# Patient Record
Sex: FEMALE | Race: WHITE | Employment: UNEMPLOYED | URBAN - METROPOLITAN AREA
[De-identification: names, ages, dates, MRNs, and addresses within clinical notes are randomized per-mention and may not be internally consistent; named-entity substitution may affect disease eponyms.]

---
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
## 2022-07-23 ENCOUNTER — HOSPITAL ENCOUNTER (EMERGENCY)
Age: 22
Discharge: HOME | End: 2022-07-23
Attending: EMERGENCY MEDICINE
Payer: COMMERCIAL

## 2022-07-23 VITALS
DIASTOLIC BLOOD PRESSURE: 83 MMHG | WEIGHT: 109 LBS | HEART RATE: 98 BPM | BODY MASS INDEX: 20.58 KG/M2 | OXYGEN SATURATION: 100 % | TEMPERATURE: 98.5 F | SYSTOLIC BLOOD PRESSURE: 123 MMHG | HEIGHT: 61 IN | RESPIRATION RATE: 17 BRPM

## 2022-07-23 DIAGNOSIS — K08.89 PAIN, DENTAL: ICD-10-CM

## 2022-07-23 DIAGNOSIS — R51.9 HEADACHE, UNSPECIFIED HEADACHE TYPE: Primary | ICD-10-CM

## 2022-07-23 PROCEDURE — 99283 EMERGENCY DEPT VISIT LOW MDM: CPT

## 2022-07-23 RX ORDER — AMOXICILLIN 500 MG/1
500 CAPSULE ORAL 3 TIMES DAILY
Qty: 21 CAPSULE | Refills: 0 | Status: SHIPPED | OUTPATIENT
Start: 2022-07-23 | End: 2022-07-23

## 2022-07-23 RX ORDER — AMOXICILLIN 400 MG/5ML
400 POWDER, FOR SUSPENSION ORAL 3 TIMES DAILY
Qty: 105 ML | Refills: 0 | Status: SHIPPED | OUTPATIENT
Start: 2022-07-23 | End: 2022-07-30

## 2022-07-23 ASSESSMENT — PAIN DESCRIPTION - DESCRIPTORS
DESCRIPTORS: SHARP
DESCRIPTORS: SHARP

## 2022-07-23 ASSESSMENT — PAIN DESCRIPTION - PAIN TYPE
TYPE: ACUTE PAIN
TYPE: ACUTE PAIN

## 2022-07-23 ASSESSMENT — PAIN DESCRIPTION - LOCATION
LOCATION: HEAD
LOCATION: HEAD

## 2022-07-23 ASSESSMENT — PAIN SCALES - GENERAL
PAINLEVEL_OUTOF10: 6
PAINLEVEL_OUTOF10: 6

## 2022-07-23 ASSESSMENT — PAIN DESCRIPTION - FREQUENCY
FREQUENCY: INTERMITTENT
FREQUENCY: INTERMITTENT

## 2022-07-23 ASSESSMENT — PAIN - FUNCTIONAL ASSESSMENT: PAIN_FUNCTIONAL_ASSESSMENT: 0-10

## 2022-07-23 NOTE — ED PROVIDER NOTES
[facility] Emergency Department      Pt Name: Noam Díaz  MRN: [id number]  [place] 2000  Date of evaluation: 7/23/2022  Provider: Lyn Smith MD  CHIEF COMPLAINT  Chief Complaint   Patient presents with    Dental Pain     Has had dental pain off and on over the past few months. Tylenol and Ibuprofen is helping with the pain. It is on the right side of her mouth. No obvious facial swelling. Headache     Developed a headache yesterday. Took Ibuprofen earlier today and just took a dose of Tylenol prior to arrival.   No fever, vomiting or diarrhea. States it may be related to her dental pain, but she never gets headaches, and the Tylenol and Ibuprofen didn't seem to help her headache. HPI  Noam Díaz is a 22 y.o. female who presents because of toothache. She has had pain to a right lower molar for several months. She has had 2 different dentists evaluate the area. One thought there was an infection and she took a course of antibiotics. The other 1 did not think there was a problem. She did have some x-rays taken at those visits. Over the past few days, she has had some increased pain to that tooth. Tylenol and ibuprofen does alleviate the discomfort. In addition, she is concerned about a headache that she developed yesterday. She has had some sharp pains in various spots on the right side of her head with some dull underlying pain. She says the medicine she has been taking does help the pain but was wondering if the tooth pain and the headache were related. She denies any numbness, tingling or focal weakness. Denies any photophobia. Denies any fever or chills. Denies any cough or congestion. She says she typically does not get headaches. Gradual onset of discomfort. No injury. REVIEW OF SYSTEMS:  No fever, no vomiting, no visual change, chronic anxiety and paranoia, no excessive life stressors Pertinent positives and negatives as per the HPI.   All other review of systems reviewed and negative. Nursing notes reviewed. PAST MEDICAL HISTORY  Past Medical History:   Diagnosis Date    Anxiety     Other insomnia      SURGICAL HISTORY  History reviewed. No pertinent surgical history. MEDICATIONS:  No current facility-administered medications on file prior to encounter. No current outpatient medications on file prior to encounter. ALLERGIES  Patient has no known allergies. SOCIAL HISTORY:  Social History     Tobacco Use    Smoking status: Never     Passive exposure: Current    Smokeless tobacco: Never   Vaping Use    Vaping Use: Never used   Substance Use Topics    Alcohol use: Never    Drug use: No     IMMUNIZATIONS:  Noncontributory    PHYSICAL EXAM  VITAL SIGNS:  Blood pressure 123/83, pulse 98, temperature 98.5 °F (36.9 °C), resp. rate 17, height 5' 1\" (1.549 m), weight 109 lb (49.4 kg), last menstrual period 06/21/2022, SpO2 100 %, not currently breastfeeding. Constitutional:  22 y.o. female nontoxic, tolerates secretions normally  HENT:  Atraumatic, mucous membranes moist, no fluctuance of the gums, some gingivitis, dental decay not apparent but does have a filling in the painful tooth, TMs clear  Eyes:   Conjunctiva clear, no icterus  Neck:  Supple, no swelling, no adenopathy  Thorax & Lungs:  Respiratory effort normal, clear, regular  Abdomen:  Non distended  Back:  No deformity  Extremities:  No cyanosis, no edema  Skin:  Warm, dry  Neurologic:  Alert, normal coordination, normal voice and speech, normal gait    DIAGNOSTIC RESULTS:  None    ED COURSE:  Uneventful    PROCEDURES:  None    CONSULTATIONS:  None    MEDICAL DECISION MAKING: Riley Zapata is a 22 y.o. female who presented with a toothache and headache. Exam is benign. There is no evidence of toxicity.   I do not feel the headache symptoms reflect a more emergent condition such as meningitis, encephalitis, ICH, sentinel bleed from ruptured aneurysm, thrombosis, TIA, pseudotumor cerebri, temporal arteritis, hypertensive urgency or emergency, acute angle glaucoma, tumor with mass effect, sinus abscess, mastoiditis, shingles, trigeminal neuralgia, CO poisoning, etc. Tanna R Cheli Kinsey was given appropriate discharge instructions. Referral to follow up provider. New Prescriptions    AMOXICILLIN (AMOXIL) 500 MG CAPSULE    Take 1 capsule by mouth in the morning and 1 capsule at noon and 1 capsule before bedtime. Do all this for 7 days. FOLLOW UP:    NOMAN Ludwig - CNP  [facility] 1  [phone]    Schedule an appointment as soon as possible for a visit       FINAL IMPRESSION:    1. Headache, unspecified headache type    2. Pain, dental      (Please note that I used voice recognition software to generate this note.   Occasionally words are mistranscribed despite my efforts to edit errors.)        Janine Day MD  07/23/22 [id number]

## 2022-07-23 NOTE — ED TRIAGE NOTES
Has had dental pain off and on over the past few months. Tylenol and Ibuprofen is helping with the pain. It is on the right side of her mouth. No obvious facial swelling. Developed a headache yesterday. Took Ibuprofen earlier today and just took a dose of Tylenol prior to arrival.   No fever, vomiting or diarrhea. States it may be related to her dental pain, but she never gets headaches, and the Tylenol and Ibuprofen didn't seem to help her headache. She is talkative and pleasant. No injury or fall.

## 2023-01-20 ENCOUNTER — OFFICE VISIT (OUTPATIENT)
Dept: FAMILY MEDICINE CLINIC | Age: 23
End: 2023-01-20
Payer: COMMERCIAL

## 2023-01-20 VITALS
OXYGEN SATURATION: 98 % | SYSTOLIC BLOOD PRESSURE: 100 MMHG | WEIGHT: 107.8 LBS | TEMPERATURE: 98.6 F | HEIGHT: 61 IN | HEART RATE: 81 BPM | BODY MASS INDEX: 20.35 KG/M2 | DIASTOLIC BLOOD PRESSURE: 60 MMHG

## 2023-01-20 DIAGNOSIS — Z76.89 ENCOUNTER TO ESTABLISH CARE: Primary | ICD-10-CM

## 2023-01-20 DIAGNOSIS — F43.22 ADJUSTMENT DISORDER WITH ANXIETY: ICD-10-CM

## 2023-01-20 PROCEDURE — G8420 CALC BMI NORM PARAMETERS: HCPCS | Performed by: NURSE PRACTITIONER

## 2023-01-20 PROCEDURE — 1036F TOBACCO NON-USER: CPT | Performed by: NURSE PRACTITIONER

## 2023-01-20 PROCEDURE — 99203 OFFICE O/P NEW LOW 30 MIN: CPT | Performed by: NURSE PRACTITIONER

## 2023-01-20 PROCEDURE — G8427 DOCREV CUR MEDS BY ELIG CLIN: HCPCS | Performed by: NURSE PRACTITIONER

## 2023-01-20 PROCEDURE — G8484 FLU IMMUNIZE NO ADMIN: HCPCS | Performed by: NURSE PRACTITIONER

## 2023-01-20 SDOH — ECONOMIC STABILITY: FOOD INSECURITY: WITHIN THE PAST 12 MONTHS, YOU WORRIED THAT YOUR FOOD WOULD RUN OUT BEFORE YOU GOT MONEY TO BUY MORE.: NEVER TRUE

## 2023-01-20 SDOH — ECONOMIC STABILITY: FOOD INSECURITY: WITHIN THE PAST 12 MONTHS, THE FOOD YOU BOUGHT JUST DIDN'T LAST AND YOU DIDN'T HAVE MONEY TO GET MORE.: NEVER TRUE

## 2023-01-20 ASSESSMENT — PATIENT HEALTH QUESTIONNAIRE - PHQ9
1. LITTLE INTEREST OR PLEASURE IN DOING THINGS: 0
SUM OF ALL RESPONSES TO PHQ QUESTIONS 1-9: 0
SUM OF ALL RESPONSES TO PHQ QUESTIONS 1-9: 0
SUM OF ALL RESPONSES TO PHQ9 QUESTIONS 1 & 2: 0
SUM OF ALL RESPONSES TO PHQ QUESTIONS 1-9: 0
2. FEELING DOWN, DEPRESSED OR HOPELESS: 0
SUM OF ALL RESPONSES TO PHQ QUESTIONS 1-9: 0

## 2023-01-20 ASSESSMENT — ENCOUNTER SYMPTOMS
COUGH: 0
ABDOMINAL PAIN: 0

## 2023-01-20 ASSESSMENT — SOCIAL DETERMINANTS OF HEALTH (SDOH): HOW HARD IS IT FOR YOU TO PAY FOR THE VERY BASICS LIKE FOOD, HOUSING, MEDICAL CARE, AND HEATING?: NOT HARD AT ALL

## 2023-01-20 NOTE — PROGRESS NOTES
[address] (:  2000) is a 25 y.o. female,Established patient, here for evaluation of the following chief complaint(s):  New Patient (To get established. ) and Anxiety (Pt does not want anxiety medication. Pt is very leary and panicked of taking medication. Pt refuses to drive and cannot ride in cars well due to being paranoid and anxiety. Pt claims to be awkward. Thinks the worst will happen with everything. Would like a referral to a psychiatrist or therapist)         ASSESSMENT/PLAN:  1. Adjustment disorder with anxiety  Ongoing, worsening. Establish with [facility]. Also gave external referral to psych due to limited availability with [facility].   - External Referral to Psychiatry     Return in 3 months (on 2023), or sooner if symptoms worsen or fail to improve. Subjective   SUBJECTIVE/OBJECTIVE:  HPI  Presents today to establish care. PMH anxiety, insomnia. Lives at home with mom, dad and sister. 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 Not sleeping well, states she has never really slept well. Denies special diets or exercise programs. States she has no motivation. Only motivated to draw and play video games. Denies SI/HI. Denies GI/  Denies gyn issues, not sexually active. Denies skin issue. States she has always felt anxious and thinks the worse of things. Denies traumatic childhood or a certain life stressor that would cause her to feel this way. No current outpatient medications on file. No current facility-administered medications for this visit. Past Medical History:   Diagnosis Date    Anxiety     Other insomnia         Review of Systems   Constitutional:  Negative for activity change and fever. Respiratory:  Negative for cough. Cardiovascular:  Negative for chest pain. Gastrointestinal:  Negative for abdominal pain. Genitourinary:  Negative for difficulty urinating and menstrual problem. Musculoskeletal: Negative. Skin: Negative. Psychiatric/Behavioral:  Positive for sleep disturbance. Objective   Physical Exam  HENT:      Head: Normocephalic and atraumatic. Right Ear: Tympanic membrane, ear canal and external ear normal.      Left Ear: Tympanic membrane, ear canal and external ear normal.      Mouth/Throat:      Mouth: Mucous membranes are moist.      Pharynx: Oropharynx is clear. Eyes:      Extraocular Movements: Extraocular movements intact. Conjunctiva/sclera: Conjunctivae normal.      Pupils: Pupils are equal, round, and reactive to light. Neck:      Vascular: No carotid bruit. Cardiovascular:      Rate and Rhythm: Normal rate and regular rhythm. Heart sounds: Normal heart sounds. Pulmonary:      Effort: Pulmonary effort is normal.      Breath sounds: Normal breath sounds. Abdominal:      General: Bowel sounds are normal.      Palpations: Abdomen is soft. Musculoskeletal:      Right lower leg: No edema. Left lower leg: No edema. Lymphadenopathy:      Cervical: No cervical adenopathy. Skin:     General: Skin is warm and dry. Neurological:      Mental Status: She is alert and oriented to person, place, and time. Psychiatric:         Mood and Affect: Affect is flat. Behavior: Behavior normal.         Thought Content:  Thought content normal.            --NOMAN Duron - NP

## 2023-06-20 ENCOUNTER — OFFICE VISIT (OUTPATIENT)
Dept: FAMILY MEDICINE CLINIC | Age: 23
End: 2023-06-20
Payer: COMMERCIAL

## 2023-06-20 VITALS
OXYGEN SATURATION: 98 % | WEIGHT: 105.6 LBS | HEIGHT: 61 IN | TEMPERATURE: 97.9 F | DIASTOLIC BLOOD PRESSURE: 70 MMHG | BODY MASS INDEX: 19.94 KG/M2 | SYSTOLIC BLOOD PRESSURE: 100 MMHG | HEART RATE: 80 BPM

## 2023-06-20 DIAGNOSIS — R59.9 SWELLING OF LYMPH NODE: Primary | ICD-10-CM

## 2023-06-20 DIAGNOSIS — R22.0 SWELLING OF GUMS: ICD-10-CM

## 2023-06-20 DIAGNOSIS — F43.22 ADJUSTMENT DISORDER WITH ANXIETY: ICD-10-CM

## 2023-06-20 PROCEDURE — 1036F TOBACCO NON-USER: CPT | Performed by: NURSE PRACTITIONER

## 2023-06-20 PROCEDURE — G8427 DOCREV CUR MEDS BY ELIG CLIN: HCPCS | Performed by: NURSE PRACTITIONER

## 2023-06-20 PROCEDURE — 99213 OFFICE O/P EST LOW 20 MIN: CPT | Performed by: NURSE PRACTITIONER

## 2023-06-20 PROCEDURE — G8420 CALC BMI NORM PARAMETERS: HCPCS | Performed by: NURSE PRACTITIONER

## 2023-06-20 RX ORDER — AMOXICILLIN AND CLAVULANATE POTASSIUM 600; 42.9 MG/5ML; MG/5ML
600 POWDER, FOR SUSPENSION ORAL 2 TIMES DAILY
Qty: 70 ML | Refills: 0 | Status: SHIPPED | OUTPATIENT
Start: 2023-06-20 | End: 2023-06-27

## 2023-06-20 SDOH — ECONOMIC STABILITY: FOOD INSECURITY: WITHIN THE PAST 12 MONTHS, THE FOOD YOU BOUGHT JUST DIDN'T LAST AND YOU DIDN'T HAVE MONEY TO GET MORE.: NEVER TRUE

## 2023-06-20 SDOH — ECONOMIC STABILITY: HOUSING INSECURITY
IN THE LAST 12 MONTHS, WAS THERE A TIME WHEN YOU DID NOT HAVE A STEADY PLACE TO SLEEP OR SLEPT IN A SHELTER (INCLUDING NOW)?: NO

## 2023-06-20 SDOH — ECONOMIC STABILITY: INCOME INSECURITY: HOW HARD IS IT FOR YOU TO PAY FOR THE VERY BASICS LIKE FOOD, HOUSING, MEDICAL CARE, AND HEATING?: NOT VERY HARD

## 2023-06-20 SDOH — ECONOMIC STABILITY: FOOD INSECURITY: WITHIN THE PAST 12 MONTHS, YOU WORRIED THAT YOUR FOOD WOULD RUN OUT BEFORE YOU GOT MONEY TO BUY MORE.: NEVER TRUE

## 2023-06-20 NOTE — PROGRESS NOTES
Kylee Sharma (:  2000) is a 25 y.o. female,Established patient, here for evaluation of the following chief complaint(s):  Oral Swelling (Past 1-2 weeks has had where her throat feels sore and swollen. Feels a lump under her chin/throat and feels it more when she is laying down. Has some trouble swallowing when eating but not enough to where she does not eat when and where she wants )         ASSESSMENT/PLAN:  1. Swelling of lymph node  New, follow pt education included. - CBC with Auto Differential; Future    2. Swelling of gums  New. Pt requested antibiotic due to hx of dental infections. Recommend pt follow up with dentist   - CBC with Auto Differential; Future    3. Adjustment disorder with anxiety  Ongoing, follow up with [facility] or external referral  - External Referral to Psychiatry       Return if symptoms worsen or fail to improve, for follow up with Dentist.         Subjective   SUBJECTIVE/OBJECTIVE:  HPI  C/o swollen lymphnode under chin, has an infected tooth right lower. Denies fevers, abd pain. Had some nausea last night, may have been anxiety. Has not seen [facility] due to trouble with scheduling and missing apts. Pt requested new referal to psych. Current Outpatient Medications   Medication Sig Dispense Refill    amoxicillin-clavulanate (AUGMENTIN-ES) 600-42.9 MG/5ML suspension Take 5 mLs by mouth 2 times daily for 7 days 70 mL 0     No current facility-administered medications for this visit. Past Medical History:   Diagnosis Date    Anxiety     Other insomnia         Review of Systems   Constitutional:  Negative for fever and unexpected weight change. HENT:  Positive for dental problem. Negative for sore throat and trouble swallowing. Respiratory:  Negative for cough. Gastrointestinal:  Negative for abdominal pain. Musculoskeletal: Negative. Skin: Negative. Psychiatric/Behavioral:  Negative for sleep disturbance.          Objective   Physical Exam  HENT:

## 2023-06-23 ENCOUNTER — TELEPHONE (OUTPATIENT)
Dept: FAMILY MEDICINE CLINIC | Age: 23
End: 2023-06-23

## 2023-06-23 ASSESSMENT — ENCOUNTER SYMPTOMS
ABDOMINAL PAIN: 0
TROUBLE SWALLOWING: 0
COUGH: 0
SORE THROAT: 0

## 2023-06-23 NOTE — TELEPHONE ENCOUNTER
Patient was last seen 6/20/23 and prescribed Augmentin , her mom said there has not been any improvement at all. She is asking if something else can be called into [facility].       Please call, [phone]

## 2023-06-23 NOTE — TELEPHONE ENCOUNTER
Swollen lymph nodes can last for a few weeks, I did not think pt needed antibiotics and gave them due to pt concern.  Recommend pt follow up with dentist.

## 2023-09-05 ENCOUNTER — OFFICE VISIT (OUTPATIENT)
Dept: FAMILY MEDICINE CLINIC | Age: 23
End: 2023-09-05
Payer: COMMERCIAL

## 2023-09-05 VITALS
WEIGHT: 102.6 LBS | HEIGHT: 61 IN | TEMPERATURE: 98.1 F | BODY MASS INDEX: 19.37 KG/M2 | HEART RATE: 85 BPM | OXYGEN SATURATION: 99 % | DIASTOLIC BLOOD PRESSURE: 64 MMHG | SYSTOLIC BLOOD PRESSURE: 98 MMHG

## 2023-09-05 DIAGNOSIS — B96.89 ACUTE BACTERIAL SINUSITIS: Primary | ICD-10-CM

## 2023-09-05 DIAGNOSIS — J01.90 ACUTE BACTERIAL SINUSITIS: Primary | ICD-10-CM

## 2023-09-05 DIAGNOSIS — J02.9 SORE THROAT: ICD-10-CM

## 2023-09-05 DIAGNOSIS — J02.9 ACUTE PHARYNGITIS, UNSPECIFIED ETIOLOGY: ICD-10-CM

## 2023-09-05 LAB
INFLUENZA A ANTIGEN, POC: NEGATIVE
INFLUENZA B ANTIGEN, POC: NEGATIVE
LOT EXPIRE DATE: NORMAL
LOT KIT NUMBER: NORMAL
SARS-COV-2, POC: NORMAL
VALID INTERNAL CONTROL: NORMAL
VENDOR AND KIT NAME POC: NORMAL

## 2023-09-05 PROCEDURE — 99213 OFFICE O/P EST LOW 20 MIN: CPT | Performed by: NURSE PRACTITIONER

## 2023-09-05 PROCEDURE — 87428 SARSCOV & INF VIR A&B AG IA: CPT | Performed by: NURSE PRACTITIONER

## 2023-09-05 RX ORDER — CEFUROXIME AXETIL 250 MG/1
250 TABLET ORAL 2 TIMES DAILY
Qty: 14 TABLET | Refills: 0 | Status: SHIPPED | OUTPATIENT
Start: 2023-09-05 | End: 2023-09-12

## 2023-09-05 RX ORDER — METHYLPREDNISOLONE 4 MG/1
TABLET ORAL
Qty: 1 KIT | Refills: 0 | Status: SHIPPED | OUTPATIENT
Start: 2023-09-05 | End: 2023-09-05

## 2023-09-05 RX ORDER — PREDNISOLONE SODIUM PHOSPHATE 15 MG/5ML
15 SOLUTION ORAL DAILY
Qty: 35 ML | Refills: 0 | Status: SHIPPED | OUTPATIENT
Start: 2023-09-05 | End: 2023-09-12

## 2023-09-05 ASSESSMENT — ENCOUNTER SYMPTOMS
SHORTNESS OF BREATH: 0
COUGH: 1
SINUS PAIN: 1
RHINORRHEA: 1
ABDOMINAL PAIN: 0
SORE THROAT: 1

## 2023-09-05 NOTE — PROGRESS NOTES
tenderness and frontal sinus tenderness present. Mouth/Throat:      Pharynx: Posterior oropharyngeal erythema present. No oropharyngeal exudate. Eyes:      Extraocular Movements: Extraocular movements intact. Conjunctiva/sclera: Conjunctivae normal.      Pupils: Pupils are equal, round, and reactive to light. Cardiovascular:      Rate and Rhythm: Normal rate and regular rhythm. Pulmonary:      Effort: Pulmonary effort is normal.      Breath sounds: Normal breath sounds. Lymphadenopathy:      Cervical: No cervical adenopathy. Neurological:      Mental Status: She is alert.             --Maverick Every, APRN - NP

## 2023-12-27 ENCOUNTER — TELEPHONE (OUTPATIENT)
Dept: FAMILY MEDICINE CLINIC | Age: 23
End: 2023-12-27

## 2023-12-27 RX ORDER — CEPHALEXIN 250 MG/5ML
500 POWDER, FOR SUSPENSION ORAL 2 TIMES DAILY
Qty: 140 ML | Refills: 0 | Status: SHIPPED | OUTPATIENT
Start: 2023-12-27 | End: 2024-01-03

## 2023-12-27 RX ORDER — PREDNISOLONE SODIUM PHOSPHATE 15 MG/5ML
15 SOLUTION ORAL DAILY
Qty: 35 ML | Refills: 0 | Status: SHIPPED | OUTPATIENT
Start: 2023-12-27 | End: 2024-01-03

## 2023-12-27 NOTE — TELEPHONE ENCOUNTER
Patient was given scripts on 9/5/23 for sore throat and swollen glands for cefuroxime/ceftin 20mg and prednisolone 5ml and she now has the same symptoms but no fever. She would like a refill on these medications to go to [facility] in [place].

## 2024-01-10 ENCOUNTER — TELEMEDICINE (OUTPATIENT)
Dept: FAMILY MEDICINE CLINIC | Age: 24
End: 2024-01-10
Payer: COMMERCIAL

## 2024-01-10 ENCOUNTER — TELEPHONE (OUTPATIENT)
Dept: FAMILY MEDICINE CLINIC | Age: 24
End: 2024-01-10

## 2024-01-10 DIAGNOSIS — U07.1 COVID: Primary | ICD-10-CM

## 2024-01-10 PROCEDURE — G8427 DOCREV CUR MEDS BY ELIG CLIN: HCPCS | Performed by: INTERNAL MEDICINE

## 2024-01-10 PROCEDURE — 99212 OFFICE O/P EST SF 10 MIN: CPT | Performed by: INTERNAL MEDICINE

## 2024-01-10 RX ORDER — ALBUTEROL SULFATE 90 UG/1
2 AEROSOL, METERED RESPIRATORY (INHALATION) 4 TIMES DAILY PRN
Qty: 18 G | Refills: 5 | Status: SHIPPED | OUTPATIENT
Start: 2024-01-10

## 2024-01-10 ASSESSMENT — ENCOUNTER SYMPTOMS
DIARRHEA: 0
ABDOMINAL PAIN: 0
SINUS PRESSURE: 1
RHINORRHEA: 1
NAUSEA: 0
VOMITING: 0
WHEEZING: 0
COUGH: 1
SHORTNESS OF BREATH: 1
CHEST TIGHTNESS: 1

## 2024-01-10 ASSESSMENT — PATIENT HEALTH QUESTIONNAIRE - PHQ9
SUM OF ALL RESPONSES TO PHQ QUESTIONS 1-9: 0
SUM OF ALL RESPONSES TO PHQ QUESTIONS 1-9: 0
1. LITTLE INTEREST OR PLEASURE IN DOING THINGS: 0
SUM OF ALL RESPONSES TO PHQ9 QUESTIONS 1 & 2: 0
2. FEELING DOWN, DEPRESSED OR HOPELESS: 0
SUM OF ALL RESPONSES TO PHQ QUESTIONS 1-9: 0
SUM OF ALL RESPONSES TO PHQ QUESTIONS 1-9: 0

## 2024-01-10 NOTE — PROGRESS NOTES
Neurological:  Positive for headaches. Negative for dizziness and light-headedness.       OBJECTIVE:  There were no vitals taken for this visit.     There is no height or weight on file to calculate BMI.     Physical Exam  Constitutional:       General: She is not in acute distress.     Appearance: Normal appearance.   HENT:      Head: Normocephalic and atraumatic.      Nose: No congestion.   Eyes:      Conjunctiva/sclera: Conjunctivae normal.   Pulmonary:      Effort: Pulmonary effort is normal.      Comments: Patient looks comfortable there is no respiratory distress no wheezing is her no coughing is seen  Musculoskeletal:      Cervical back: Neck supple.   Skin:     Findings: No rash.   Neurological:      Mental Status: She is alert.      Comments: Answers all questions          ASSESSMENT/PLAN:    Tanna was seen today for other.    Diagnoses and all orders for this visit:    COVID  -     nirmatrelvir/ritonavir 300/100 (PAXLOVID, 300/100,) 20 x 150 MG & 10 x 100MG TBPK; Take 3 tablets (two 150 mg nirmatrelvir and one 100 mg ritonavir tablets) by mouth every 12 hours for 5 days.  -     albuterol sulfate HFA (VENTOLIN HFA) 108 (90 Base) MCG/ACT inhaler; Inhale 2 puffs into the lungs 4 times daily as needed for Wheezing      Patient is to use alternating Tylenol and Advil for achiness and can use some cold and sinus products for the congestion.  She is instructed that if she is getting more shortness shortness of breath and more chest pain to give us a call or go to the ER.  She is also advised to get a over-the-counter pulse ox at her pharmacy  Orders Placed This Encounter   Medications    nirmatrelvir/ritonavir 300/100 (PAXLOVID, 300/100,) 20 x 150 MG & 10 x 100MG TBPK     Sig: Take 3 tablets (two 150 mg nirmatrelvir and one 100 mg ritonavir tablets) by mouth every 12 hours for 5 days.     Dispense:  30 tablet     Refill:  0     Order Specific Question:   Does this patient qualify for COVID-19 antIviral therapy

## 2024-01-10 NOTE — TELEPHONE ENCOUNTER
Pt mom called and pt has covid sx started 1-9-24 sx are Body aches, Headache, SOB, cough, congestion no fever.       Manohar Cheng  [phone]

## 2024-04-30 ENCOUNTER — TELEPHONE (OUTPATIENT)
Dept: FAMILY MEDICINE CLINIC | Age: 24
End: 2024-04-30

## 2024-04-30 NOTE — TELEPHONE ENCOUNTER
Called pt to cancel her appt she scheduled for 5.2.24. No answer and mailbox is full. Provider is not in at the time requested.

## 2024-05-01 ENCOUNTER — HOSPITAL ENCOUNTER (EMERGENCY)
Age: 24
Discharge: HOME | End: 2024-05-01
Attending: EMERGENCY MEDICINE
Payer: COMMERCIAL

## 2024-05-01 VITALS
HEART RATE: 83 BPM | OXYGEN SATURATION: 97 % | RESPIRATION RATE: 16 BRPM | DIASTOLIC BLOOD PRESSURE: 77 MMHG | HEIGHT: 61 IN | WEIGHT: 109.13 LBS | BODY MASS INDEX: 20.6 KG/M2 | SYSTOLIC BLOOD PRESSURE: 112 MMHG | TEMPERATURE: 99.4 F

## 2024-05-01 DIAGNOSIS — J02.8 ACUTE PHARYNGITIS DUE TO OTHER SPECIFIED ORGANISMS: Primary | ICD-10-CM

## 2024-05-01 LAB — S PYO AG THROAT QL: NEGATIVE

## 2024-05-01 PROCEDURE — 99283 EMERGENCY DEPT VISIT LOW MDM: CPT

## 2024-05-01 PROCEDURE — 6370000000 HC RX 637 (ALT 250 FOR IP): Performed by: EMERGENCY MEDICINE

## 2024-05-01 PROCEDURE — 87081 CULTURE SCREEN ONLY: CPT

## 2024-05-01 PROCEDURE — 87880 STREP A ASSAY W/OPTIC: CPT

## 2024-05-01 RX ORDER — PREDNISOLONE 15 MG/5ML
30 SOLUTION ORAL ONCE
Status: COMPLETED | OUTPATIENT
Start: 2024-05-01 | End: 2024-05-01

## 2024-05-01 RX ORDER — AMOXICILLIN 250 MG/5ML
500 POWDER, FOR SUSPENSION ORAL ONCE
Status: COMPLETED | OUTPATIENT
Start: 2024-05-01 | End: 2024-05-01

## 2024-05-01 RX ORDER — PREDNISOLONE SODIUM PHOSPHATE 15 MG/5ML
30 SOLUTION ORAL DAILY
Qty: 40 ML | Refills: 0 | Status: SHIPPED | OUTPATIENT
Start: 2024-05-01 | End: 2024-05-05

## 2024-05-01 RX ORDER — AMOXICILLIN 250 MG/5ML
500 POWDER, FOR SUSPENSION ORAL 3 TIMES DAILY
Qty: 210 ML | Refills: 0 | Status: SHIPPED | OUTPATIENT
Start: 2024-05-01 | End: 2024-05-08

## 2024-05-01 RX ADMIN — Medication 30 MG: at 19:31

## 2024-05-01 RX ADMIN — AMOXICILLIN 500 MG: 250 POWDER, FOR SUSPENSION ORAL at 19:31

## 2024-05-01 ASSESSMENT — PAIN SCALES - GENERAL: PAINLEVEL_OUTOF10: 0

## 2024-05-01 ASSESSMENT — PAIN - FUNCTIONAL ASSESSMENT: PAIN_FUNCTIONAL_ASSESSMENT: 0-10

## 2024-05-01 NOTE — ED PROVIDER NOTES
[facility] Emergency Department      Pt Name: Tanna Springer  MRN: [id number]  Birthdate 2000  Date of evaluation: 5/1/2024  Provider: KEERTHI DONOVAN MD  CHIEF COMPLAINT  Chief Complaint   Patient presents with    Pharyngitis     Pt. C/o sore throat with swelling sensation for over a week, more fatigue in 2-3 days, no nausea, vomiting or diarrhea     HPI  Tanna Springer is a 23 y.o. female who presents because of sore throat.  She has had pain for the past 3 days, a swollen feeling for the past week.  She has been able to swallow liquids.  Denies cough.  Has had some nasal congestion.  No high fevers.  She has had fatigue.  Denies n/v/d.  No known sick contacts.    REVIEW OF SYSTEMS:  Some fatigue, taking liquids Pertinent positives and negatives as per the HPI.  All other pertinent review of systems reviewed and negative.  Nursing notes reviewed.    PAST MEDICAL HISTORY  Past Medical History:   Diagnosis Date    Anxiety     Other insomnia      SURGICAL HISTORY  History reviewed. No pertinent surgical history.  MEDICATIONS:  No current facility-administered medications on file prior to encounter.     No current outpatient medications on file prior to encounter.     ALLERGIES  Patient has no known allergies.  SOCIAL HISTORY:  Social History     Tobacco Use    Smoking status: Never     Passive exposure: Current    Smokeless tobacco: Never   Vaping Use    Vaping Use: Never used   Substance Use Topics    Alcohol use: Never    Drug use: No     IMMUNIZATIONS:  Noncontributory    PHYSICAL EXAM  VITAL SIGNS:  Blood pressure 111/75, pulse 90, temperature 99.4 °F (37.4 °C), temperature source Oral, resp. rate 16, height 1.549 m (5' 1\"), weight 49.5 kg (109 lb 2 oz), last menstrual period 04/04/2024, SpO2 100 %, not currently breastfeeding.  Constitutional:  23 y.o. female tolerates secretions normally  HENT:  Atraumatic, mucous membranes moist, no trismus, uvula midline, TMs clear, throat appears red without

## 2024-05-01 NOTE — ED TRIAGE NOTES
Pt. C/o sore throat with swelling sensation for over a week, more fatigue in 2-3 days, no nausea, vomiting or diarrhea

## 2024-05-02 NOTE — ED NOTES
Discharge instructions reviewed with patient.  All questions answered to her satisfaction.  She verbalized understanding of all instructions.  Patient ambulates from ED, gait is steady, all belongings in hand, in no apparent distress at this time.

## 2024-05-03 LAB — S PYO THROAT QL CULT: NORMAL

## 2024-05-06 ENCOUNTER — OFFICE VISIT (OUTPATIENT)
Dept: FAMILY MEDICINE CLINIC | Age: 24
End: 2024-05-06
Payer: COMMERCIAL

## 2024-05-06 VITALS
TEMPERATURE: 98.1 F | HEIGHT: 61 IN | SYSTOLIC BLOOD PRESSURE: 108 MMHG | WEIGHT: 108.8 LBS | DIASTOLIC BLOOD PRESSURE: 70 MMHG | BODY MASS INDEX: 20.54 KG/M2 | HEART RATE: 67 BPM | OXYGEN SATURATION: 99 %

## 2024-05-06 DIAGNOSIS — J02.9 SORE THROAT: Primary | ICD-10-CM

## 2024-05-06 PROCEDURE — G8427 DOCREV CUR MEDS BY ELIG CLIN: HCPCS | Performed by: NURSE PRACTITIONER

## 2024-05-06 PROCEDURE — 1036F TOBACCO NON-USER: CPT | Performed by: NURSE PRACTITIONER

## 2024-05-06 PROCEDURE — 99213 OFFICE O/P EST LOW 20 MIN: CPT | Performed by: NURSE PRACTITIONER

## 2024-05-06 PROCEDURE — G8420 CALC BMI NORM PARAMETERS: HCPCS | Performed by: NURSE PRACTITIONER

## 2024-05-06 ASSESSMENT — ENCOUNTER SYMPTOMS
TROUBLE SWALLOWING: 1
SORE THROAT: 1
SHORTNESS OF BREATH: 0
COUGH: 0
ABDOMINAL PAIN: 0

## 2024-05-06 NOTE — PROGRESS NOTES
Tanna Springer (:  2000) is a 23 y.o. female,Established patient, here for evaluation of the following chief complaint(s):  Follow-up (ER follow up for sore throat. Pt has not finished abx from ER yet. Throat does feel a little better. )      Assessment & Plan   ASSESSMENT/PLAN:  1. Sore throat  Stable. Follow pt instructions included.   - ibuprofen (ADVIL;MOTRIN) 100 MG/5ML suspension; Take 30 mLs by mouth every 6 hours as needed for Pain  Dispense: 473 mL; Refill: 1       Return if symptoms worsen or fail to improve.         Subjective   SUBJECTIVE/OBJECTIVE:  HPI  Presents today for sore throat f/u. Testing was negative in ED, they did treat with steroids and antibiotics. States she has some improvement in the \"tightness\" worse at times. Swallowing ok. Denies sob, wheezing or cough. Completed steroids, has 2 days left on antibiotics. Taking Ibuprofen, has not had in 2 days.     Current Outpatient Medications   Medication Sig Dispense Refill    ibuprofen (ADVIL;MOTRIN) 100 MG/5ML suspension Take 30 mLs by mouth every 6 hours as needed for Pain 473 mL 1    amoxicillin (AMOXIL) 250 MG/5ML suspension Take 10 mLs by mouth 3 times daily for 7 days 210 mL 0     No current facility-administered medications for this visit.        Past Medical History:   Diagnosis Date    Anxiety     Other insomnia         Review of Systems   Constitutional:  Negative for fever and unexpected weight change.   HENT:  Positive for sore throat and trouble swallowing.    Respiratory:  Negative for cough and shortness of breath.    Cardiovascular:  Negative for chest pain.   Gastrointestinal:  Negative for abdominal pain.   Skin: Negative.           Objective   Physical Exam  HENT:      Head: Normocephalic and atraumatic.      Mouth/Throat:      Pharynx: Posterior oropharyngeal erythema present.   Eyes:      Extraocular Movements: Extraocular movements intact.      Conjunctiva/sclera: Conjunctivae normal.      Pupils: Pupils are

## 2024-08-08 ENCOUNTER — TELEPHONE (OUTPATIENT)
Dept: FAMILY MEDICINE CLINIC | Age: 24
End: 2024-08-08

## 2024-08-08 RX ORDER — AMOXICILLIN 250 MG/5ML
500 POWDER, FOR SUSPENSION ORAL 3 TIMES DAILY
Qty: 210 ML | Refills: 0 | Status: SHIPPED | OUTPATIENT
Start: 2024-08-08 | End: 2024-08-15

## 2024-08-08 RX ORDER — AMOXICILLIN AND CLAVULANATE POTASSIUM 875; 125 MG/1; MG/1
1 TABLET, FILM COATED ORAL 2 TIMES DAILY
Qty: 14 TABLET | Refills: 0 | Status: SHIPPED | OUTPATIENT
Start: 2024-08-08 | End: 2024-08-08

## 2024-08-08 NOTE — TELEPHONE ENCOUNTER
Pt dad calling pt is having dental pain and has appoint next week with her dentist. Can an antibiotic called in.    Arnoldo Ponce    [phone]- Tanna

## 2024-09-28 ENCOUNTER — HOSPITAL ENCOUNTER (EMERGENCY)
Age: 24
Discharge: HOME | End: 2024-09-28
Attending: STUDENT IN AN ORGANIZED HEALTH CARE EDUCATION/TRAINING PROGRAM
Payer: COMMERCIAL

## 2024-09-28 VITALS
HEART RATE: 83 BPM | WEIGHT: 108 LBS | DIASTOLIC BLOOD PRESSURE: 89 MMHG | TEMPERATURE: 98.1 F | BODY MASS INDEX: 20.39 KG/M2 | SYSTOLIC BLOOD PRESSURE: 131 MMHG | RESPIRATION RATE: 16 BRPM | OXYGEN SATURATION: 100 % | HEIGHT: 61 IN

## 2024-09-28 DIAGNOSIS — K04.7 DENTAL INFECTION: ICD-10-CM

## 2024-09-28 DIAGNOSIS — K08.89 PAIN, DENTAL: Primary | ICD-10-CM

## 2024-09-28 PROCEDURE — 6370000000 HC RX 637 (ALT 250 FOR IP): Performed by: STUDENT IN AN ORGANIZED HEALTH CARE EDUCATION/TRAINING PROGRAM

## 2024-09-28 PROCEDURE — 99284 EMERGENCY DEPT VISIT MOD MDM: CPT

## 2024-09-28 PROCEDURE — 6360000002 HC RX W HCPCS: Performed by: STUDENT IN AN ORGANIZED HEALTH CARE EDUCATION/TRAINING PROGRAM

## 2024-09-28 PROCEDURE — 96372 THER/PROPH/DIAG INJ SC/IM: CPT

## 2024-09-28 RX ORDER — AMOXICILLIN AND CLAVULANATE POTASSIUM 400; 57 MG/5ML; MG/5ML
875 POWDER, FOR SUSPENSION ORAL 2 TIMES DAILY
Qty: 152.6 ML | Refills: 0 | Status: SHIPPED | OUTPATIENT
Start: 2024-09-28 | End: 2024-10-05

## 2024-09-28 RX ORDER — AMOXICILLIN AND CLAVULANATE POTASSIUM 250; 62.5 MG/5ML; MG/5ML
875 POWDER, FOR SUSPENSION ORAL ONCE
Status: COMPLETED | OUTPATIENT
Start: 2024-09-28 | End: 2024-09-28

## 2024-09-28 RX ORDER — LIDOCAINE HYDROCHLORIDE 20 MG/ML
15 SOLUTION OROPHARYNGEAL ONCE
Status: COMPLETED | OUTPATIENT
Start: 2024-09-28 | End: 2024-09-28

## 2024-09-28 RX ORDER — KETOROLAC TROMETHAMINE 15 MG/ML
15 INJECTION, SOLUTION INTRAMUSCULAR; INTRAVENOUS ONCE
Status: COMPLETED | OUTPATIENT
Start: 2024-09-28 | End: 2024-09-28

## 2024-09-28 RX ADMIN — LIDOCAINE HYDROCHLORIDE 15 ML: 20 SOLUTION ORAL at 21:52

## 2024-09-28 RX ADMIN — AMOXICILLIN AND CLAVULANATE POTASSIUM 875 MG: 250; 62.5 POWDER, FOR SUSPENSION ORAL at 22:03

## 2024-09-28 RX ADMIN — KETOROLAC TROMETHAMINE 15 MG: 15 INJECTION, SOLUTION INTRAMUSCULAR; INTRAVENOUS at 22:04

## 2024-09-28 ASSESSMENT — PAIN SCALES - GENERAL
PAINLEVEL_OUTOF10: 8
PAINLEVEL_OUTOF10: 8
PAINLEVEL_OUTOF10: 5
PAINLEVEL_OUTOF10: 8

## 2024-09-28 ASSESSMENT — PAIN DESCRIPTION - LOCATION
LOCATION: TEETH
LOCATION: TEETH

## 2024-09-28 ASSESSMENT — PAIN - FUNCTIONAL ASSESSMENT
PAIN_FUNCTIONAL_ASSESSMENT: PREVENTS OR INTERFERES SOME ACTIVE ACTIVITIES AND ADLS
PAIN_FUNCTIONAL_ASSESSMENT: 0-10
PAIN_FUNCTIONAL_ASSESSMENT: 0-10

## 2024-09-28 ASSESSMENT — PAIN DESCRIPTION - ORIENTATION: ORIENTATION: RIGHT;LOWER

## 2024-09-28 ASSESSMENT — PAIN DESCRIPTION - DESCRIPTORS: DESCRIPTORS: ACHING;THROBBING

## 2024-09-28 ASSESSMENT — PAIN DESCRIPTION - FREQUENCY: FREQUENCY: CONTINUOUS

## 2024-09-28 ASSESSMENT — PAIN DESCRIPTION - PAIN TYPE: TYPE: ACUTE PAIN

## 2024-09-28 ASSESSMENT — PAIN DESCRIPTION - ONSET: ONSET: PROGRESSIVE

## 2024-09-28 ASSESSMENT — LIFESTYLE VARIABLES
HOW OFTEN DO YOU HAVE A DRINK CONTAINING ALCOHOL: NEVER
HOW MANY STANDARD DRINKS CONTAINING ALCOHOL DO YOU HAVE ON A TYPICAL DAY: PATIENT DOES NOT DRINK

## 2024-09-29 NOTE — DISCHARGE INSTRUCTIONS
Today you are seen here numerous department for dental pain.  We believe a dental infection.  You the water to your symptoms need to return for any fevers, difficulty opening your mouth, swelling, difficulty swallowing, and for new/concerning symptoms.  Otherwise recommend follow-up with dentist soon as possible.  Use antibiotic as directed.

## 2024-09-29 NOTE — ED TRIAGE NOTES
Patient ambulatory to Room 9 with c/o right lower back dental pain for several days. She reports she has been unable to contact her dentist and tonight the pain is too intense for her. She reports she is unable to eat and has no appetite due to the pain. She states she has taken tylenol and ibuprofen, last tylenol was 2000 and has been using numbing gel. She denies fever, denies nausea, vomiting and diarrhea. She is awake, alert, oriented, respirations easy & regular, skin w/d, cap refill brisk. Dr. Rushing in room to examine patient.

## 2024-09-29 NOTE — ED NOTES
No s/s of allergic reaction from toradol injection. Patient reports her pain is \"a little better\". Discharge instructions reviewed with patient and verbalized understanding, denies further questions and successful teach back occurred. Discharged ambulatory with steady gait to ED lobby. Written discharge instructions and dental referrals provide to patient. Prescriptions sent electronically to patient's pharmacy.

## 2024-09-29 NOTE — ED PROVIDER NOTES
Neurological:      General: No focal deficit present.      Mental Status: She is alert and oriented to person, place, and time.         No orders to display       ED COURSE & MEDICAL DECISION MAKING    Tanna Springer is a 24 y.o. female with past medical history significant for anxiety who presents dental pain.    DDX includes: Dental abscess, periapical abscess, Ash angina.  Plan:  No orders of the defined types were placed in this encounter.       Medical reasoning and significant workup findings:   No acute distress, vital signs here are reassuring  Patient is here today with 2 to 3 days of dental pain questionably was likely due to a possible dental infection, she will be started on Augmentin and given a dose of Toradol here as well as received some viscous lidocaine.  She is instructed she needs to follow-up closely with dentistry.  Examination revealed no evidence of deeper space infection such as Ash's angina and she was counseled at length on strict return precautions.         Impression: Dental infection, dental pain      Patient's results were discussed with the patient. Patient's questions were answered. I reviewed the patients medical records and noted there allergies, past medical history, and previous visits, pertinent information summarized in HPI. I reviewed the nursing notes.    Feel that patient is appropriate for discharge to follow up with PCP/Dentistry. Patient agreeable with this plan. Patient provided with prescription for Augmentin. Return precautions given. Patient discharged in stable condition.         Elke Rushing MD  09/29/24 [id number]

## 2024-09-29 NOTE — ED NOTES
Patient reports she cannot take pills and she needs time to prepare herself for the IM injection. Dr. Rushing notified.

## 2025-05-13 ENCOUNTER — OFFICE VISIT (OUTPATIENT)
Dept: FAMILY MEDICINE CLINIC | Age: 25
End: 2025-05-13
Payer: COMMERCIAL

## 2025-05-13 VITALS
DIASTOLIC BLOOD PRESSURE: 80 MMHG | SYSTOLIC BLOOD PRESSURE: 114 MMHG | HEART RATE: 90 BPM | BODY MASS INDEX: 21.98 KG/M2 | OXYGEN SATURATION: 99 % | TEMPERATURE: 97.4 F | HEIGHT: 61 IN | WEIGHT: 116.4 LBS

## 2025-05-13 DIAGNOSIS — B34.9 VIRAL SYNDROME: Primary | ICD-10-CM

## 2025-05-13 LAB
INFLUENZA A ANTIGEN, POC: NEGATIVE
INFLUENZA B ANTIGEN, POC: NEGATIVE
LOT EXPIRE DATE: NORMAL
LOT KIT NUMBER: NORMAL
SARS-COV-2, POC: NORMAL
VALID INTERNAL CONTROL: YES
VENDOR AND KIT NAME POC: NORMAL

## 2025-05-13 PROCEDURE — G8420 CALC BMI NORM PARAMETERS: HCPCS | Performed by: INTERNAL MEDICINE

## 2025-05-13 PROCEDURE — G8427 DOCREV CUR MEDS BY ELIG CLIN: HCPCS | Performed by: INTERNAL MEDICINE

## 2025-05-13 PROCEDURE — 99213 OFFICE O/P EST LOW 20 MIN: CPT | Performed by: INTERNAL MEDICINE

## 2025-05-13 PROCEDURE — 87428 SARSCOV & INF VIR A&B AG IA: CPT | Performed by: INTERNAL MEDICINE

## 2025-05-13 PROCEDURE — 1036F TOBACCO NON-USER: CPT | Performed by: INTERNAL MEDICINE

## 2025-05-13 SDOH — ECONOMIC STABILITY: FOOD INSECURITY: WITHIN THE PAST 12 MONTHS, THE FOOD YOU BOUGHT JUST DIDN'T LAST AND YOU DIDN'T HAVE MONEY TO GET MORE.: NEVER TRUE

## 2025-05-13 SDOH — ECONOMIC STABILITY: FOOD INSECURITY: WITHIN THE PAST 12 MONTHS, YOU WORRIED THAT YOUR FOOD WOULD RUN OUT BEFORE YOU GOT MONEY TO BUY MORE.: NEVER TRUE

## 2025-05-13 ASSESSMENT — ENCOUNTER SYMPTOMS
SHORTNESS OF BREATH: 0
COUGH: 1
VOMITING: 0
SORE THROAT: 1
NAUSEA: 1
SINUS PRESSURE: 0
TROUBLE SWALLOWING: 0
SINUS PAIN: 0

## 2025-05-13 ASSESSMENT — PATIENT HEALTH QUESTIONNAIRE - PHQ9
SUM OF ALL RESPONSES TO PHQ QUESTIONS 1-9: 0
SUM OF ALL RESPONSES TO PHQ QUESTIONS 1-9: 0
1. LITTLE INTEREST OR PLEASURE IN DOING THINGS: NOT AT ALL
SUM OF ALL RESPONSES TO PHQ QUESTIONS 1-9: 0
2. FEELING DOWN, DEPRESSED OR HOPELESS: NOT AT ALL
SUM OF ALL RESPONSES TO PHQ QUESTIONS 1-9: 0

## 2025-05-13 NOTE — PATIENT INSTRUCTIONS
- Prior left MCA strokes  - On Eliquis and statin at baseline OTC cerumex or debrox can help with earwax build up

## 2025-05-13 NOTE — PROGRESS NOTES
Tanna Springer (:  2000) is a 24 y.o. female,Established patient, here for evaluation of the following chief complaint(s):  Congestion (Nausea, scratchy throat  x 4 days)      Subjective       HPI    5 days ago. Headaches. Next day nausea and scratchy throat. Fatigue. Body aches for two days. Last night body aches/fatigue improved. Some slight dizziness. Overall feeling better today. Still have scratchiness in throat. No fevers/chills. Minimal cough. Left ear pain.  Does have tooth on the left side that needs to be worked on.   Review of Systems   Constitutional:  Positive for fatigue. Negative for chills and fever.   HENT:  Positive for ear pain and sore throat. Negative for sinus pressure, sinus pain and trouble swallowing.    Respiratory:  Positive for cough. Negative for shortness of breath.    Gastrointestinal:  Positive for nausea. Negative for vomiting.             Objective     /80 (BP Site: Left Upper Arm, Patient Position: Sitting, BP Cuff Size: Medium Adult)   Pulse 90   Temp 97.4 °F (36.3 °C) (Temporal)   Ht 1.549 m (5' 1\")   Wt 52.8 kg (116 lb 6.4 oz)   SpO2 99%   BMI 21.99 kg/m²      Physical Exam  Vitals and nursing note reviewed.   Constitutional:       General: She is not in acute distress.  HENT:      Head: Normocephalic and atraumatic.      Right Ear: There is impacted cerumen.      Left Ear: There is impacted cerumen.      Mouth/Throat:      Mouth: Mucous membranes are moist.      Pharynx: Oropharynx is clear.   Cardiovascular:      Rate and Rhythm: Normal rate and regular rhythm.      Pulses: Normal pulses.      Heart sounds: Normal heart sounds.   Pulmonary:      Effort: Pulmonary effort is normal.      Breath sounds: Normal breath sounds.   Lymphadenopathy:      Cervical: No cervical adenopathy.   Skin:     General: Skin is warm and dry.   Neurological:      General: No focal deficit present.      Mental Status: She is alert and oriented to person, place, and